# Patient Record
Sex: MALE
[De-identification: names, ages, dates, MRNs, and addresses within clinical notes are randomized per-mention and may not be internally consistent; named-entity substitution may affect disease eponyms.]

---

## 2020-08-04 ENCOUNTER — APPOINTMENT (OUTPATIENT)
Dept: OTOLARYNGOLOGY | Facility: CLINIC | Age: 70
End: 2020-08-04
Payer: MEDICARE

## 2020-08-04 VITALS
HEART RATE: 60 BPM | TEMPERATURE: 98 F | OXYGEN SATURATION: 97 % | HEIGHT: 69 IN | BODY MASS INDEX: 24.44 KG/M2 | DIASTOLIC BLOOD PRESSURE: 84 MMHG | SYSTOLIC BLOOD PRESSURE: 151 MMHG | RESPIRATION RATE: 15 BRPM | WEIGHT: 165 LBS

## 2020-08-04 PROCEDURE — 69210 REMOVE IMPACTED EAR WAX UNI: CPT

## 2020-08-04 PROCEDURE — 99203 OFFICE O/P NEW LOW 30 MIN: CPT | Mod: 25

## 2020-08-04 NOTE — REVIEW OF SYSTEMS
[Patient Intake Form Reviewed] : Patient intake form was reviewed [As Noted in HPI] : as noted in HPI [Hearing Loss] : hearing loss [Negative] : Heme/Lymph

## 2020-08-26 NOTE — HISTORY OF PRESENT ILLNESS
[de-identified] : 69 years old male patient with history of plugged ears sensation for the past 2 weeks.   Patient is present today in the office with bilateral cerumen impaction

## 2020-08-26 NOTE — PROCEDURE
[] : Removal of Cerumen [FreeTextEntry5] : Cerumen impaction was removed via operating head otoscope.  Fregoso suction # 5 and ear curette

## 2020-08-26 NOTE — PHYSICAL EXAM
[Normal] : mucosa is normal [Midline] : trachea located in midline position [de-identified] : ria AU removed

## 2020-08-26 NOTE — REASON FOR VISIT
[Initial Evaluation] : an initial evaluation for [FreeTextEntry2] : plugged ears sensation for the past 2 weeks.  Patient states his level of severity is a level 2 out of 10 and it occurs constant.  Patient states nothing helps to improve or worsens his plugged ears sensation for the past 2 weeks.

## 2020-08-26 NOTE — CONSULT LETTER
[Dear  ___] : Dear  [unfilled], [Consult Letter:] : I had the pleasure of evaluating your patient, [unfilled]. [Please see my note below.] : Please see my note below. [Consult Closing:] : Thank you very much for allowing me to participate in the care of this patient.  If you have any questions, please do not hesitate to contact me. [Sincerely,] : Sincerely, [FreeTextEntry3] : Julio César Ramirez MD, FACS\par Professor of Otolaryngology, [facility] [employer] at [facility]\par Director, Center for Sleep Disorders, Department of Otolaryngology, [facility]\par , Head & Neck Service Line, [facility]\par

## 2020-11-02 ENCOUNTER — APPOINTMENT (OUTPATIENT)
Dept: UROLOGY | Facility: CLINIC | Age: 70
End: 2020-11-02
Payer: MEDICARE

## 2020-11-02 VITALS — HEART RATE: 62 BPM | TEMPERATURE: 98.2 F | DIASTOLIC BLOOD PRESSURE: 80 MMHG | SYSTOLIC BLOOD PRESSURE: 172 MMHG

## 2020-11-02 DIAGNOSIS — S72.009A FRACTURE OF UNSPECIFIED PART OF NECK OF UNSPECIFIED FEMUR, INITIAL ENCOUNTER FOR CLOSED FRACTURE: ICD-10-CM

## 2020-11-02 DIAGNOSIS — Z80.9 FAMILY HISTORY OF MALIGNANT NEOPLASM, UNSPECIFIED: ICD-10-CM

## 2020-11-02 PROCEDURE — 99204 OFFICE O/P NEW MOD 45 MIN: CPT

## 2020-11-09 LAB
PSA FREE FLD-MCNC: 23 %
PSA FREE SERPL-MCNC: 1.51 NG/ML
PSA SERPL-MCNC: 6.52 NG/ML

## 2021-05-14 ENCOUNTER — APPOINTMENT (OUTPATIENT)
Dept: UROLOGY | Facility: CLINIC | Age: 71
End: 2021-05-14

## 2021-07-13 ENCOUNTER — APPOINTMENT (OUTPATIENT)
Dept: OTOLARYNGOLOGY | Facility: CLINIC | Age: 71
End: 2021-07-13
Payer: MEDICARE

## 2021-07-13 VITALS
OXYGEN SATURATION: 99 % | BODY MASS INDEX: 25.18 KG/M2 | SYSTOLIC BLOOD PRESSURE: 149 MMHG | TEMPERATURE: 98.1 F | HEIGHT: 69 IN | HEART RATE: 57 BPM | DIASTOLIC BLOOD PRESSURE: 85 MMHG | WEIGHT: 170 LBS

## 2021-07-13 PROCEDURE — 99213 OFFICE O/P EST LOW 20 MIN: CPT | Mod: 25

## 2021-07-13 PROCEDURE — 69210 REMOVE IMPACTED EAR WAX UNI: CPT

## 2021-07-13 NOTE — HISTORY OF PRESENT ILLNESS
[de-identified] : 70 years old male patient with history of plugged ears sensation for the past 2 weeks.   Patient is present today in the office with bilateral cerumen impaction

## 2021-07-13 NOTE — PROCEDURE
[Cerumen Impaction] : Cerumen Impaction [] : Removal of Cerumen [FreeTextEntry5] : Severe Cerumen .  Ear Alligator, Fregoso suction # 5 and ear curette

## 2021-07-13 NOTE — CONSULT LETTER
[Sincerely,] : Sincerely, [FreeTextEntry3] : Julio César Ramirez MD, FACS\par Professor of Otolaryngology, [facility] [employer] at [facility]\par Director, Center for Sleep Disorders, Department of Otolaryngology, [facility]\par , Head & Neck Service Line, [facility]\par

## 2021-07-13 NOTE — REASON FOR VISIT
[Subsequent Evaluation] : a subsequent evaluation for [FreeTextEntry2] : plugged ears sensation for the past 2 weeks.

## 2021-07-13 NOTE — HISTORY OF PRESENT ILLNESS
[de-identified] : 70 years old male patient with history of plugged ears sensation for the past 2 weeks.   Patient is present today in the office with bilateral cerumen impaction

## 2021-07-13 NOTE — PHYSICAL EXAM
[Midline] : trachea located in midline position [Normal] : no rashes [de-identified] : ria AU removed

## 2021-07-13 NOTE — CONSULT LETTER
[Sincerely,] : Sincerely, [FreeTextEntry3] : Julio César Ramirez MD, FACS\par Professor of Otolaryngology, [facility] [employer] at [facility]\par Director, Center for Sleep Disorders, Department of Otolaryngology, [facility]\par , Head & Neck Service Line, [facility]\par

## 2021-11-12 ENCOUNTER — APPOINTMENT (OUTPATIENT)
Dept: UROLOGY | Facility: CLINIC | Age: 71
End: 2021-11-12

## 2021-11-19 ENCOUNTER — APPOINTMENT (OUTPATIENT)
Dept: UROLOGY | Facility: CLINIC | Age: 71
End: 2021-11-19
Payer: MEDICARE

## 2021-11-19 VITALS
OXYGEN SATURATION: 99 % | HEART RATE: 75 BPM | DIASTOLIC BLOOD PRESSURE: 87 MMHG | SYSTOLIC BLOOD PRESSURE: 154 MMHG | TEMPERATURE: 97.6 F

## 2021-11-19 PROCEDURE — 99213 OFFICE O/P EST LOW 20 MIN: CPT

## 2021-11-19 NOTE — ASSESSMENT
[FreeTextEntry1] : \par Impression/plan: 71 year-old gentleman with BPH with elevated PSA. \par \par PVR >301 ml\par \par 1. PSA: will call with results. \par 2. BMP: assess kidney function\par 4. Renal US: r/o hydronephrosis\par 5. F/u in 6 months. REcheck PVR

## 2021-11-19 NOTE — HISTORY OF PRESENT ILLNESS
[FreeTextEntry1] : 71 year-old gentleman who is a former patient of Dr. Huang with a history of elevated PSA ranging between 5 to 8. PSA 11/2020 6.52. Patient stopped taking Alfuzosin 10 months ago due to side effects. Patient is overall happy with voiding pattern and symptoms. Reports generally strong stream throughout the day. Drink 5-8 (8 oz.) glasses of water and 2 cups of coffee a day. Nocturia 2-3x daily. \par Discussed starting a different medication due to the high PVR. Patient refused to try another medication at this time. \par PVR >301 ml

## 2021-11-19 NOTE — PHYSICAL EXAM
[General Appearance - Well Developed] : well developed [General Appearance - Well Nourished] : well nourished [Normal Appearance] : normal appearance [Well Groomed] : well groomed [General Appearance - In No Acute Distress] : no acute distress [Abdomen Soft] : soft [Abdomen Tenderness] : non-tender [Costovertebral Angle Tenderness] : no ~M costovertebral angle tenderness [Edema] : no peripheral edema [] : no respiratory distress [Respiration, Rhythm And Depth] : normal respiratory rhythm and effort [Exaggerated Use Of Accessory Muscles For Inspiration] : no accessory muscle use [Oriented To Time, Place, And Person] : oriented to person, place, and time [Affect] : the affect was normal [Mood] : the mood was normal [Not Anxious] : not anxious [Normal Station and Gait] : the gait and station were normal for the patient's age [Prostate Tenderness] : the prostate was not tender [No Prostate Nodules] : no prostate nodules [Prostate Size ___ gm] : prostate size [unfilled] gm

## 2021-11-22 ENCOUNTER — NON-APPOINTMENT (OUTPATIENT)
Age: 71
End: 2021-11-22

## 2021-11-22 LAB
ANION GAP SERPL CALC-SCNC: 12 MMOL/L
BUN SERPL-MCNC: 13 MG/DL
CALCIUM SERPL-MCNC: 9.8 MG/DL
CHLORIDE SERPL-SCNC: 102 MMOL/L
CO2 SERPL-SCNC: 25 MMOL/L
CREAT SERPL-MCNC: 0.71 MG/DL
GLUCOSE SERPL-MCNC: 110 MG/DL
POTASSIUM SERPL-SCNC: 4.9 MMOL/L
SODIUM SERPL-SCNC: 139 MMOL/L

## 2021-11-23 LAB
PSA FREE FLD-MCNC: 21 %
PSA FREE SERPL-MCNC: 1.43 NG/ML
PSA SERPL-MCNC: 6.68 NG/ML

## 2022-05-31 ENCOUNTER — RESULT REVIEW (OUTPATIENT)
Age: 72
End: 2022-05-31

## 2022-05-31 ENCOUNTER — APPOINTMENT (OUTPATIENT)
Dept: ULTRASOUND IMAGING | Facility: CLINIC | Age: 72
End: 2022-05-31
Payer: MEDICARE

## 2022-05-31 PROCEDURE — 76770 US EXAM ABDO BACK WALL COMP: CPT

## 2022-06-02 ENCOUNTER — APPOINTMENT (OUTPATIENT)
Dept: UROLOGY | Facility: CLINIC | Age: 72
End: 2022-06-02

## 2022-06-15 ENCOUNTER — APPOINTMENT (OUTPATIENT)
Dept: UROLOGY | Facility: CLINIC | Age: 72
End: 2022-06-15
Payer: MEDICARE

## 2022-06-15 VITALS
SYSTOLIC BLOOD PRESSURE: 143 MMHG | OXYGEN SATURATION: 95 % | TEMPERATURE: 98 F | HEART RATE: 66 BPM | DIASTOLIC BLOOD PRESSURE: 77 MMHG

## 2022-06-15 PROCEDURE — 99213 OFFICE O/P EST LOW 20 MIN: CPT

## 2022-06-15 NOTE — LETTER BODY
[Dear  ___] : Dear  [unfilled], [Courtesy Letter:] : I had the pleasure of seeing your patient, [unfilled], in my office today. [Please see my note below.] : Please see my note below. [Consult Closing:] : Thank you very much for allowing me to participate in the care of this patient.  If you have any questions, please do not hesitate to contact me. [Sincerely,] : Sincerely, [FreeTextEntry3] : No Schneider MD\par System Director [facility]\par Department of Urology\par [facility] \par   at [facility]\par  of Urology\par [facility] [employer] at [facility]\par

## 2022-06-15 NOTE — HISTORY OF PRESENT ILLNESS
[FreeTextEntry1] : 71 year-old gentleman who is a former patient of Dr. Huang with a history of elevated PSA ranging between 5 to 8. PSA 11/2020 6.52, 11/21 6.68. Creat .71 11/21. Patient stopped taking Alfuzosin 10 months ago due to side effects. Patient is overall happy with voiding pattern and symptoms. Reports generally strong stream throughout the day. Drink 5-8 (8 oz.) glasses of water and 2 cups of coffee a day. Nocturia 2-3x daily. \par \par Discussed starting a different medication due to the high PVR. Patient refused to try another medication at this time. \par \par PVR >301 ml \par \par 6/15/22\par \par Patient is here today for follow-up.  He states his voiding pattern has been stable.  Nocturia x2.  He did have a renal ultrasound which showed no hydronephrosis with a postvoid residual of 300 mL.  He continues on tadalafil 5 mg p.o. daily.\par \par

## 2022-06-15 NOTE — ASSESSMENT
[FreeTextEntry1] : \par \par Impression/plan: 71-year-old gentleman with BPH/LUTS and incomplete bladder emptying.\par \par 1.  I had a long discussion with the patient regarding medical therapy to help him with improved bladder emptying as I am concerned that this could be a bigger issue as he ages.  We discussed the option of finasteride, he does not want to pursue due to potential sexual side effects.  I have encouraged him to see my partner Dr. Tripathi to discuss potentially minimally invasive options again to help with emptying and avoid future chronic issues.

## 2022-08-16 ENCOUNTER — APPOINTMENT (OUTPATIENT)
Dept: OTOLARYNGOLOGY | Facility: CLINIC | Age: 72
End: 2022-08-16

## 2022-08-16 VITALS
WEIGHT: 163 LBS | DIASTOLIC BLOOD PRESSURE: 76 MMHG | RESPIRATION RATE: 17 BRPM | HEART RATE: 60 BPM | HEIGHT: 68.5 IN | OXYGEN SATURATION: 96 % | TEMPERATURE: 60 F | SYSTOLIC BLOOD PRESSURE: 152 MMHG | BODY MASS INDEX: 24.42 KG/M2

## 2022-08-16 PROCEDURE — 69210 REMOVE IMPACTED EAR WAX UNI: CPT

## 2022-08-16 PROCEDURE — 99213 OFFICE O/P EST LOW 20 MIN: CPT | Mod: 25

## 2022-08-16 RX ORDER — DORZOLAMIDE HYDROCHLORIDE 20 MG/ML
2 SOLUTION OPHTHALMIC
Qty: 10 | Refills: 0 | Status: ACTIVE | COMMUNITY
Start: 2022-02-22

## 2022-08-16 RX ORDER — ALFUZOSIN HYDROCHLORIDE 10 MG/1
10 TABLET, EXTENDED RELEASE ORAL DAILY
Qty: 30 | Refills: 6 | Status: COMPLETED | COMMUNITY
Start: 2020-11-02 | End: 2022-08-16

## 2022-08-16 RX ORDER — DICLOFENAC POTASSIUM 50 MG/1
50 TABLET, COATED ORAL
Qty: 30 | Refills: 0 | Status: ACTIVE | COMMUNITY
Start: 2022-04-08

## 2022-08-16 RX ORDER — DICLOFENAC SODIUM 1% 10 MG/G
1 GEL TOPICAL
Qty: 100 | Refills: 0 | Status: ACTIVE | COMMUNITY
Start: 2022-04-08

## 2022-08-16 NOTE — REASON FOR VISIT
[Subsequent Evaluation] : a subsequent evaluation for [FreeTextEntry2] : plugged ears sensation for the past week

## 2022-08-16 NOTE — HISTORY OF PRESENT ILLNESS
[de-identified] : 71 years old male patient with history of plugged ears sensation for the past 2 week.   Patient is present today in the office with bilateral cerumen impaction

## 2022-08-16 NOTE — CONSULT LETTER
[Sincerely,] : Sincerely, [FreeTextEntry3] : Julio César Ramirez MD, FACS\par Professor of Otolaryngology, [facility] [employer] at [facility]\par Director, Center for Sleep Disorders, Department of Otolaryngology, [facility]\par , Head & Neck Service Line, [facility]\par

## 2022-08-16 NOTE — PHYSICAL EXAM
[Midline] : trachea located in midline position [Normal] : tympanic membranes are normal in both ears [de-identified] : ria AU removed

## 2022-09-13 ENCOUNTER — APPOINTMENT (OUTPATIENT)
Dept: UROLOGY | Facility: CLINIC | Age: 72
End: 2022-09-13

## 2022-09-13 VITALS
DIASTOLIC BLOOD PRESSURE: 84 MMHG | HEART RATE: 66 BPM | SYSTOLIC BLOOD PRESSURE: 166 MMHG | TEMPERATURE: 97.6 F | HEIGHT: 69 IN | BODY MASS INDEX: 24.14 KG/M2 | WEIGHT: 163 LBS

## 2022-09-13 LAB
BILIRUB UR QL STRIP: NORMAL
CLARITY UR: CLEAR
COLLECTION METHOD: NORMAL
GLUCOSE UR-MCNC: NORMAL
HCG UR QL: 0.2 EU/DL
HGB UR QL STRIP.AUTO: NORMAL
KETONES UR-MCNC: NORMAL
LEUKOCYTE ESTERASE UR QL STRIP: NORMAL
NITRITE UR QL STRIP: NORMAL
PH UR STRIP: 7.5
PROT UR STRIP-MCNC: NORMAL
SP GR UR STRIP: 1.01

## 2022-09-13 PROCEDURE — 99214 OFFICE O/P EST MOD 30 MIN: CPT

## 2022-09-13 NOTE — HISTORY OF PRESENT ILLNESS
[FreeTextEntry1] : 71 year-old gentleman who is a former patient of Dr. Huang with a history of elevated PSA ranging between 5 to 8. PSA 11/2020 6.52, 11/21 6.68. Creat .71 11/21. Patient stopped taking Alfuzosin 10 months ago due to side effects. Patient is overall happy with voiding pattern and symptoms. Reports generally strong stream throughout the day. Drink 5-8 (8 oz.) glasses of water and 2 cups of coffee a day. Nocturia 2-3x daily. \par \par Discussed starting a different medication due to the high PVR. Patient refused to try another medication at this time. \par \par PVR >301 ml \par \par 6/15/22\par \par Patient is here today for follow-up. He states his voiding pattern has been stable. Nocturia x2. He did have a renal ultrasound which showed no hydronephrosis with a postvoid residual of 300 mL. He continues on tadalafil 5 mg p.o. daily.\par \par 9/13/22 (first visit with Dr. Tripathi): Patient states urinary symptoms are stable. He is not particularly bothered. He remains on tadalafil 5 mg daily. No fevers, chills, dysuria, hematuria, flank pain.\par \par IPSS 14, QOL 2, patient declined PVR\par \par PSA 11/23/21--6.68\par \par Prostate MRI 2019: No concerning lesions, 72 cc prostate

## 2022-09-13 NOTE — LETTER BODY
[Dear  ___] : Dear  [unfilled], [Courtesy Letter:] : I had the pleasure of seeing your patient, [unfilled], in my office today. [Please see my note below.] : Please see my note below. [Consult Closing:] : Thank you very much for allowing me to participate in the care of this patient.  If you have any questions, please do not hesitate to contact me. [Sincerely,] : Sincerely, [FreeTextEntry3] : Sumeet Tripathi MD

## 2022-09-13 NOTE — ASSESSMENT
[FreeTextEntry1] : Assessment: Mr. RACHNA HATCH  is a 71 year year old man with moderate LUTS, prostate volume of 72 cc on MRI 2019, taking tadalafil with good response, elevated PVR, elevated PSAs with several negative prostate biopsies and MRI In 2019. \par \par Plan: I spent this consultation with Mr. HATCH discussing the causes, sequelae, and management of an enlarged prostate. I explained that enlargement of the prostate is common among men and there is increasing prevalence among older men. The severity of symptoms of an enlarged prostate, however, can vary widely. These symptoms are often "obstructive," characterized by weak force of stream, straining, and hesitancy, due to the impediment to bladder emptying. Additionally, over time, the bladder itself may change becoming thick-walled, less compliant, overactive with a lower capacity resulting in the "irritative" symptoms of urinary frequency and urgency. In the long term, the bladder muscles can start to become weaker and lead to the inability to empty the bladder and the inability to urinate at all. \par \par Medical management with alpha-blockers, which facilitates bladder emptying, is the first-line therapy. Furthermore, 5-alpha reductase inhibitors to shrink the prostate and/or anticholinergics to relax the bladder may also be added. Indications for surgical intervention include urinary retention, urinary tract infection, evidence of uropathy and worsening renal function, bladder stones, and gross hematuria.\par \par We discussed different therapeutic options including TURP, PVP, simple prostatectomy (open, laparoscopic or transurethral laser enucleation), Aquablation, Urolift therapy, and Rezum in addition to full continuation of medical therapy. Discussed the issues of incontinence, retrograde ejaculation, erectile dysfunction, irritative voiding symptoms, injury to urethra and bladder, persistence of irritative voiding symptoms, urethral stricture or bladder neck contracture, need for open surgery to repair the injury, erectile dysfunction and infertility.\par \par At this point, he would like to monitor symptoms. He is most concerned about the sexual side effects of different treatment options.\par  - Continue tadalafil \par  - F/U PSA\par  - F/U in 6 months\par

## 2022-09-15 LAB
PSA FREE FLD-MCNC: 21 %
PSA FREE SERPL-MCNC: 1.34 NG/ML
PSA SERPL-MCNC: 6.48 NG/ML

## 2022-12-13 ENCOUNTER — APPOINTMENT (OUTPATIENT)
Dept: UROLOGY | Facility: CLINIC | Age: 72
End: 2022-12-13

## 2023-03-14 ENCOUNTER — APPOINTMENT (OUTPATIENT)
Dept: UROLOGY | Facility: CLINIC | Age: 73
End: 2023-03-14
Payer: MEDICARE

## 2023-03-14 LAB
BILIRUB UR QL STRIP: NORMAL
CLARITY UR: CLEAR
COLLECTION METHOD: NORMAL
GLUCOSE UR-MCNC: NORMAL
HCG UR QL: 1 EU/DL
HGB UR QL STRIP.AUTO: NORMAL
KETONES UR-MCNC: NORMAL
LEUKOCYTE ESTERASE UR QL STRIP: NORMAL
NITRITE UR QL STRIP: NORMAL
PH UR STRIP: 6
PROT UR STRIP-MCNC: NORMAL
SP GR UR STRIP: 1.01

## 2023-03-14 PROCEDURE — 81003 URINALYSIS AUTO W/O SCOPE: CPT | Mod: QW

## 2023-03-14 PROCEDURE — 99214 OFFICE O/P EST MOD 30 MIN: CPT

## 2023-03-14 PROCEDURE — 51798 US URINE CAPACITY MEASURE: CPT

## 2023-03-14 NOTE — ASSESSMENT
[FreeTextEntry1] : 72 yr old male with BPH with elevated PSA and moderate LUTS controlled with tadalafil daily. We will repeat PSA today, keeping in mind his sexual activity yesterday. If it is higher than his baseline, we will repeat. His last MRI was in 2019, can consider repeating it at this point. He is asymptomatic from the incomplete bladder emptying, we will continue to monitor it as he is not bothered at this time. \par \par -F/u PSA\par -Consider repeat prostate MRI\par -RTC 12 months or after MRI

## 2023-03-14 NOTE — HISTORY OF PRESENT ILLNESS
[FreeTextEntry1] : 71 year-old gentleman who is a former patient of Dr. Huang with a history of elevated PSA ranging between 5 to 8. PSA 11/2020 6.52, 11/21 6.68. Creat .71 11/21. Patient stopped taking Alfuzosin 10 months ago due to side effects. Patient is overall happy with voiding pattern and symptoms. Reports generally strong stream throughout the day. Drink 5-8 (8 oz.) glasses of water and 2 cups of coffee a day. Nocturia 2-3x daily. \par \par Discussed starting a different medication due to the high PVR. Patient refused to try another medication at this time. \par \par PVR >301 ml \par \par 6/15/22\par \par Patient is here today for follow-up. He states his voiding pattern has been stable. Nocturia x2. He did have a renal ultrasound which showed no hydronephrosis with a postvoid residual of 300 mL. He continues on tadalafil 5 mg p.o. daily.\par \par 9/13/22 (first visit with Dr. Tripathi): Patient states urinary symptoms are stable. He is not particularly bothered. He remains on tadalafil 5 mg daily. No fevers, chills, dysuria, hematuria, flank pain.\par \par IPSS 14, QOL 2, patient declined PVR\par \par PSA 11/23/21--6.68\par \par Prostate MRI 2019: No concerning lesions, 72 cc prostate \par \par 3/14/23: \par On tadalafil 5 mg which works very well for his urination and erections. \par Last PSA was 9/13/22-- 6.48 \par \par Of note, he had intercourse yesterday. He is aware this may falsely elevate PSA, and he will repeat it if it comes back higher than his baseline. \par \par IPSS: 11, QOL: 2 \par PVR: 248 ml

## 2023-03-14 NOTE — END OF VISIT
Impression: Vitreous degeneration, bilateral: H43.813. Plan: There is no evidence of retinal pathology. All signs, symptoms, and risks of retinal detachment and tears were discussed in detail. Patient instructed to call the office immediately if any symptoms noted. Recommend the patient return to office yearly for follow up. [Time Spent: ___ minutes] : I have spent [unfilled] minutes of time on the encounter.

## 2023-03-15 DIAGNOSIS — R97.20 ELEVATED PROSTATE, SPECIFIC ANTIGEN [PSA]: ICD-10-CM

## 2023-03-15 LAB
PSA FREE FLD-MCNC: 16 %
PSA FREE SERPL-MCNC: 1.42 NG/ML
PSA SERPL-MCNC: 8.82 NG/ML

## 2023-03-20 LAB
APPEARANCE: CLEAR
BACTERIA UR CULT: NORMAL
BACTERIA: NEGATIVE
BILIRUBIN URINE: NEGATIVE
BLOOD URINE: NEGATIVE
COLOR: NORMAL
GLUCOSE QUALITATIVE U: NEGATIVE
HYALINE CASTS: 0 /LPF
KETONES URINE: NEGATIVE
LEUKOCYTE ESTERASE URINE: NEGATIVE
MICROSCOPIC-UA: NORMAL
NITRITE URINE: NEGATIVE
PH URINE: 6.5
PROTEIN URINE: NEGATIVE
RED BLOOD CELLS URINE: 9 /HPF
SPECIFIC GRAVITY URINE: 1.02
SQUAMOUS EPITHELIAL CELLS: 1 /HPF
UROBILINOGEN URINE: NORMAL
WHITE BLOOD CELLS URINE: 2 /HPF

## 2023-03-30 ENCOUNTER — TRANSCRIPTION ENCOUNTER (OUTPATIENT)
Age: 73
End: 2023-03-30

## 2023-03-30 ENCOUNTER — NON-APPOINTMENT (OUTPATIENT)
Age: 73
End: 2023-03-30

## 2023-03-30 LAB
APPEARANCE: CLEAR
BACTERIA: NEGATIVE
BILIRUBIN URINE: NEGATIVE
BLOOD URINE: NEGATIVE
COLOR: NORMAL
GLUCOSE QUALITATIVE U: NEGATIVE
HYALINE CASTS: 0 /LPF
KETONES URINE: NEGATIVE
LEUKOCYTE ESTERASE URINE: NEGATIVE
MICROSCOPIC-UA: NORMAL
NITRITE URINE: NEGATIVE
PH URINE: 6
PROTEIN URINE: NEGATIVE
PSA FREE FLD-MCNC: 20 %
PSA FREE SERPL-MCNC: 1.28 NG/ML
PSA SERPL-MCNC: 6.54 NG/ML
RED BLOOD CELLS URINE: 2 /HPF
SPECIFIC GRAVITY URINE: 1.01
SQUAMOUS EPITHELIAL CELLS: 0 /HPF
UROBILINOGEN URINE: NORMAL
WHITE BLOOD CELLS URINE: 1 /HPF

## 2023-11-14 ENCOUNTER — APPOINTMENT (OUTPATIENT)
Dept: OTOLARYNGOLOGY | Facility: CLINIC | Age: 73
End: 2023-11-14
Payer: MEDICARE

## 2023-11-14 VITALS
WEIGHT: 161 LBS | SYSTOLIC BLOOD PRESSURE: 145 MMHG | DIASTOLIC BLOOD PRESSURE: 87 MMHG | HEART RATE: 70 BPM | OXYGEN SATURATION: 97 % | TEMPERATURE: 97.7 F | HEIGHT: 68.5 IN | BODY MASS INDEX: 24.12 KG/M2

## 2023-11-14 DIAGNOSIS — H92.03 OTALGIA, BILATERAL: ICD-10-CM

## 2023-11-14 DIAGNOSIS — H61.23 IMPACTED CERUMEN, BILATERAL: ICD-10-CM

## 2023-11-14 DIAGNOSIS — H93.8X3 OTHER SPECIFIED DISORDERS OF EAR, BILATERAL: ICD-10-CM

## 2023-11-14 PROCEDURE — 69210 REMOVE IMPACTED EAR WAX UNI: CPT

## 2023-11-14 PROCEDURE — 99213 OFFICE O/P EST LOW 20 MIN: CPT | Mod: 25

## 2024-03-12 ENCOUNTER — APPOINTMENT (OUTPATIENT)
Dept: UROLOGY | Facility: CLINIC | Age: 74
End: 2024-03-12
Payer: MEDICARE

## 2024-03-12 VITALS
TEMPERATURE: 98.2 F | SYSTOLIC BLOOD PRESSURE: 155 MMHG | HEIGHT: 68 IN | WEIGHT: 161 LBS | DIASTOLIC BLOOD PRESSURE: 73 MMHG | OXYGEN SATURATION: 96 % | HEART RATE: 63 BPM | BODY MASS INDEX: 24.4 KG/M2

## 2024-03-12 DIAGNOSIS — R97.20 BENIGN PROSTATIC HYPERPLASIA WITHOUT LOWER URINARY TRACT SYMPMS: ICD-10-CM

## 2024-03-12 DIAGNOSIS — N40.0 BENIGN PROSTATIC HYPERPLASIA WITHOUT LOWER URINARY TRACT SYMPMS: ICD-10-CM

## 2024-03-12 DIAGNOSIS — R33.9 RETENTION OF URINE, UNSPECIFIED: ICD-10-CM

## 2024-03-12 DIAGNOSIS — R39.9 UNSPECIFIED SYMPTOMS AND SIGNS INVOLVING THE GENITOURINARY SYSTEM: ICD-10-CM

## 2024-03-12 PROCEDURE — G2211 COMPLEX E/M VISIT ADD ON: CPT

## 2024-03-12 PROCEDURE — 51798 US URINE CAPACITY MEASURE: CPT

## 2024-03-12 PROCEDURE — 99214 OFFICE O/P EST MOD 30 MIN: CPT

## 2024-03-12 RX ORDER — TADALAFIL 5 MG/1
5 TABLET ORAL
Qty: 90 | Refills: 3 | Status: ACTIVE | COMMUNITY
Start: 2021-11-23 | End: 1900-01-01

## 2024-03-12 NOTE — ASSESSMENT
[FreeTextEntry1] : 72 yr old male with BPH with elevated PSA and moderate LUTS controlled with tadalafil daily. We will repeat PSA today. His last MRI was in 2019, can consider repeating it at this point if PSA remains elevated. He is asymptomatic from the incomplete bladder emptying, we will continue to monitor it as he is not bothered at this time.   History of microhematuria, will check UA.  -F/u PSA, UA -Consider repeat prostate MRI -RTC 12 months or after MRI

## 2024-03-12 NOTE — HISTORY OF PRESENT ILLNESS
[FreeTextEntry1] : 71 year-old gentleman who is a former patient of Dr. Huang with a history of elevated PSA ranging between 5 to 8. PSA 11/2020 6.52, 11/21 6.68. Creat .71 11/21. Patient stopped taking Alfuzosin 10 months ago due to side effects. Patient is overall happy with voiding pattern and symptoms. Reports generally strong stream throughout the day. Drink 5-8 (8 oz.) glasses of water and 2 cups of coffee a day. Nocturia 2-3x daily.  Discussed starting a different medication due to the high PVR. Patient refused to try another medication at this time.  PVR >301 m  6/15/22 Patient is here today for follow-up. He states his voiding pattern has been stable. Nocturia x2. He did have a renal ultrasound which showed no hydronephrosis with a postvoid residual of 300 mL. He continues on tadalafil 5 mg p.o. daily.  9/13/22 (first visit with Dr. Tripathi): Patient states urinary symptoms are stable. He is not particularly bothered. He remains on tadalafil 5 mg daily. No fevers, chills, dysuria, hematuria, flank pain.  IPSS 14, QOL 2, patient declined PVR  PSA 11/23/21--6.68  Prostate MRI 2019: No concerning lesions, 72 cc prostate  3/14/23: On tadalafil 5 mg which works very well for his urination and erections. Last PSA was 9/13/22-- 6.48   Of note, he had intercourse yesterday. He is aware this may falsely elevate PSA, and he will repeat it if it comes back higher than his baseline.  IPSS: 11, QOL: 2 PVR: 248 ml  3/12/24: Doing well. Tadalafil helping with urination and erections. No current complaints, would like to continue.   cc prior to voiding, stable from prior  PSA: PSA 11/23/21--6.68; 9/13/22-- 6.48; 3/2023--8.82; 3/24/23--6.54

## 2024-03-14 LAB
APPEARANCE: CLEAR
BACTERIA UR CULT: NORMAL
BACTERIA: NEGATIVE /HPF
BILIRUBIN URINE: NEGATIVE
BLOOD URINE: NEGATIVE
CAST: 0 /LPF
COLOR: YELLOW
EPITHELIAL CELLS: 0 /HPF
GLUCOSE QUALITATIVE U: NEGATIVE MG/DL
KETONES URINE: NEGATIVE MG/DL
LEUKOCYTE ESTERASE URINE: NEGATIVE
MICROSCOPIC-UA: NORMAL
NITRITE URINE: NEGATIVE
PH URINE: 6
PROTEIN URINE: NEGATIVE MG/DL
PSA FREE FLD-MCNC: 20 %
PSA FREE SERPL-MCNC: 1.49 NG/ML
PSA SERPL-MCNC: 7.54 NG/ML
RED BLOOD CELLS URINE: 4 /HPF
SPECIFIC GRAVITY URINE: 1.02
UROBILINOGEN URINE: 0.2 MG/DL
WHITE BLOOD CELLS URINE: 2 /HPF

## 2024-05-30 ENCOUNTER — NON-APPOINTMENT (OUTPATIENT)
Age: 74
End: 2024-05-30

## 2024-09-13 ENCOUNTER — APPOINTMENT (OUTPATIENT)
Dept: UROLOGY | Facility: CLINIC | Age: 74
End: 2024-09-13
Payer: MEDICARE

## 2024-09-13 DIAGNOSIS — R33.9 RETENTION OF URINE, UNSPECIFIED: ICD-10-CM

## 2024-09-13 DIAGNOSIS — R97.20 BENIGN PROSTATIC HYPERPLASIA WITHOUT LOWER URINARY TRACT SYMPMS: ICD-10-CM

## 2024-09-13 DIAGNOSIS — R39.9 UNSPECIFIED SYMPTOMS AND SIGNS INVOLVING THE GENITOURINARY SYSTEM: ICD-10-CM

## 2024-09-13 DIAGNOSIS — R97.20 ELEVATED PROSTATE, SPECIFIC ANTIGEN [PSA]: ICD-10-CM

## 2024-09-13 DIAGNOSIS — N40.0 BENIGN PROSTATIC HYPERPLASIA WITHOUT LOWER URINARY TRACT SYMPMS: ICD-10-CM

## 2024-09-13 PROCEDURE — G2211 COMPLEX E/M VISIT ADD ON: CPT

## 2024-09-13 PROCEDURE — 51798 US URINE CAPACITY MEASURE: CPT

## 2024-09-13 PROCEDURE — 99213 OFFICE O/P EST LOW 20 MIN: CPT

## 2024-09-13 NOTE — HISTORY OF PRESENT ILLNESS
[FreeTextEntry1] : 71 year-old gentleman who is a former patient of Dr. Huang with a history of elevated PSA ranging between 5 to 8. PSA 11/2020 6.52, 11/21 6.68. Creat .71 11/21. Patient stopped taking Alfuzosin 10 months ago due to side effects. Patient is overall happy with voiding pattern and symptoms. Reports generally strong stream throughout the day. Drink 5-8 (8 oz.) glasses of water and 2 cups of coffee a day. Nocturia 2-3x daily.  Discussed starting a different medication due to the high PVR. Patient refused to try another medication at this time.  PVR >301 m  6/15/22 Patient is here today for follow-up. He states his voiding pattern has been stable. Nocturia x2. He did have a renal ultrasound which showed no hydronephrosis with a postvoid residual of 300 mL. He continues on tadalafil 5 mg p.o. daily.  9/13/22 (first visit with Dr. Tripathi): Patient states urinary symptoms are stable. He is not particularly bothered. He remains on tadalafil 5 mg daily. No fevers, chills, dysuria, hematuria, flank pain.  IPSS 14, QOL 2, patient declined PVR  PSA 11/23/21--6.68  Prostate MRI 2019: No concerning lesions, 72 cc prostate  3/14/23: On tadalafil 5 mg which works very well for his urination and erections. Last PSA was 9/13/22-- 6.48   Of note, he had intercourse yesterday. He is aware this may falsely elevate PSA, and he will repeat it if it comes back higher than his baseline.  IPSS: 11, QOL: 2 PVR: 248 ml  3/12/24: Doing well. Tadalafil helping with urination and erections. No current complaints, would like to continue.   cc prior to voiding, stable from prior  9/13/24: Returns for follow up. Taking tadalafil 5 mg with good effect for LUTS. PSA at last visit was higher   cc, had not voided for several hours, could not void in office.  PSA: PSA 11/23/21--6.68; 9/13/22-- 6.48; 3/2023--8.82; 3/24/23--6.54; 3/12/24--7.54

## 2024-09-13 NOTE — ASSESSMENT
[FreeTextEntry1] : 72 yr old male with BPH with elevated PSA and moderate LUTS controlled with tadalafil daily. We will repeat PSA today. His last MRI was in 2019, can consider repeating it at this point if PSA remains elevated. He is taking tadalafil with good effect.  History of microhematuria, last UA in 3/2024 negative.  -F/u PSA -Consider repeat prostate MRI -RTC 12 months or after MRI

## 2024-09-17 ENCOUNTER — NON-APPOINTMENT (OUTPATIENT)
Age: 74
End: 2024-09-17

## 2024-09-17 LAB — PSA SERPL-MCNC: 7.59 NG/ML

## 2024-10-09 ENCOUNTER — NON-APPOINTMENT (OUTPATIENT)
Age: 74
End: 2024-10-09

## 2024-10-09 ENCOUNTER — APPOINTMENT (OUTPATIENT)
Dept: OTOLARYNGOLOGY | Facility: CLINIC | Age: 74
End: 2024-10-09
Payer: MEDICARE

## 2024-10-09 VITALS
BODY MASS INDEX: 24.4 KG/M2 | HEIGHT: 68 IN | WEIGHT: 161 LBS | SYSTOLIC BLOOD PRESSURE: 157 MMHG | DIASTOLIC BLOOD PRESSURE: 76 MMHG | TEMPERATURE: 98.1 F | HEART RATE: 72 BPM | OXYGEN SATURATION: 97 %

## 2024-10-09 DIAGNOSIS — H61.23 IMPACTED CERUMEN, BILATERAL: ICD-10-CM

## 2024-10-09 PROCEDURE — 99213 OFFICE O/P EST LOW 20 MIN: CPT | Mod: 25

## 2024-10-09 PROCEDURE — 69210 REMOVE IMPACTED EAR WAX UNI: CPT

## 2025-06-18 ENCOUNTER — APPOINTMENT (OUTPATIENT)
Dept: UROLOGY | Facility: CLINIC | Age: 75
End: 2025-06-18
Payer: MEDICARE

## 2025-06-18 ENCOUNTER — NON-APPOINTMENT (OUTPATIENT)
Age: 75
End: 2025-06-18

## 2025-06-18 VITALS
BODY MASS INDEX: 24.4 KG/M2 | HEIGHT: 68 IN | SYSTOLIC BLOOD PRESSURE: 111 MMHG | HEART RATE: 79 BPM | DIASTOLIC BLOOD PRESSURE: 64 MMHG | WEIGHT: 161 LBS | TEMPERATURE: 97.7 F

## 2025-06-18 PROCEDURE — 51798 US URINE CAPACITY MEASURE: CPT

## 2025-06-18 PROCEDURE — 99214 OFFICE O/P EST MOD 30 MIN: CPT

## 2025-06-18 PROCEDURE — G2211 COMPLEX E/M VISIT ADD ON: CPT

## 2025-06-19 ENCOUNTER — NON-APPOINTMENT (OUTPATIENT)
Age: 75
End: 2025-06-19

## 2025-06-19 LAB — PSA SERPL-MCNC: 6.94 NG/ML

## 2025-08-19 ENCOUNTER — APPOINTMENT (OUTPATIENT)
Dept: OTOLARYNGOLOGY | Facility: CLINIC | Age: 75
End: 2025-08-19
Payer: MEDICARE

## 2025-08-19 VITALS
BODY MASS INDEX: 24.4 KG/M2 | OXYGEN SATURATION: 95 % | WEIGHT: 161 LBS | HEART RATE: 83 BPM | TEMPERATURE: 98.6 F | SYSTOLIC BLOOD PRESSURE: 135 MMHG | HEIGHT: 68 IN | DIASTOLIC BLOOD PRESSURE: 82 MMHG

## 2025-08-19 DIAGNOSIS — H61.23 IMPACTED CERUMEN, BILATERAL: ICD-10-CM

## 2025-08-19 PROCEDURE — 99213 OFFICE O/P EST LOW 20 MIN: CPT | Mod: 25

## 2025-08-19 PROCEDURE — 69210 REMOVE IMPACTED EAR WAX UNI: CPT
